# Patient Record
Sex: MALE | Race: WHITE
[De-identification: names, ages, dates, MRNs, and addresses within clinical notes are randomized per-mention and may not be internally consistent; named-entity substitution may affect disease eponyms.]

---

## 2020-01-16 ENCOUNTER — HOSPITAL ENCOUNTER (EMERGENCY)
Dept: HOSPITAL 25 - UCCORT | Age: 25
Discharge: HOME | End: 2020-01-16
Payer: COMMERCIAL

## 2020-01-16 VITALS — DIASTOLIC BLOOD PRESSURE: 76 MMHG | SYSTOLIC BLOOD PRESSURE: 136 MMHG

## 2020-01-16 DIAGNOSIS — F17.210: ICD-10-CM

## 2020-01-16 DIAGNOSIS — B02.9: Primary | ICD-10-CM

## 2020-01-16 PROCEDURE — G0463 HOSPITAL OUTPT CLINIC VISIT: HCPCS

## 2020-01-16 PROCEDURE — 99202 OFFICE O/P NEW SF 15 MIN: CPT

## 2020-01-16 NOTE — UC
General HPI





- HPI Summary


HPI Summary: 





States he noticed left armpit pain about a week ago.  A few days later he 

noticed a rash on the back of his arm near his armpit.  States the rash is 

itchy and he has been putting hydrocortisone on it.  No fevers.  No weight loss 

or night sweats.  Mild chronic cough, smokes 1/2 ppd.  Denies any trauma. 

States he has been under stress lately with moving into a new house and getting 

laid off from his job.  Meds: reviewed 





- History of Current Complaint


Chief Complaint: UCGeneralIllness


Stated Complaint: LT ARMPIT PAIN


Time Seen by Provider: 01/16/20 07:53


Pain Intensity: 5





- Allergy/Home Medications


Allergies/Adverse Reactions: 


 Allergies











Allergy/AdvReac Type Severity Reaction Status Date / Time


 


No Known Allergies Allergy   Verified 01/16/20 07:32














PMH/Surg Hx/FS Hx/Imm Hx


Previously Healthy: Yes





- Surgical History


Surgical History: Yes


Surgery Procedure, Year, and Place: neck mole removed as child.  dental 

extractions, 1/3/20





- Family History


Known Family History: Positive: None


Family History: no cardio-vascular issues in patients family





- Social History


Alcohol Use: Occasionally


Substance Use Type: None


Smoking Status (MU): Heavy Every Day Tobacco Smoker


Type: Cigarettes


Amount Used/How Often: 8-10 CIGS DAILY


Length of Time of Smoking/Using Tobacco: 3yrs


Have You Smoked in the Last Year: Yes


Household Exposure Type: Cigarettes





Review of Systems


All Other Systems Reviewed And Are Negative: Yes


Constitutional: Positive: Negative





Physical Exam


Triage Information Reviewed: Yes


Appearance: Well-Appearing


Vital Signs: 


 Initial Vital Signs











Temp  98.5 F   01/16/20 07:33


 


Pulse  76   01/16/20 07:33


 


Resp  16   01/16/20 07:33


 


BP  136/76   01/16/20 07:33


 


Pulse Ox  100   01/16/20 07:33











Vital Signs Reviewed: Yes


Eyes: Positive: Conjunctiva Clear


ENT: Positive: Normal ENT inspection


Neck: Positive: Supple, Nontender


Skin: Positive: Other - vesicular rash with erythema along dermatomal pattern 

on upper left torso.  No drainage.  No masses or lesions in armpit region.  

Could not apprecaite significant adenopathy





Course/Dx





- Course


Course Of Treatment: 





Rash with armpit pain 





Rash consistent with shingles 








Plan 


Discussed discontinuing the hydrocortisone 





Start Valtrex as prescribed 


Would start with Ibuprofen as needed for pain 


Keep area covered until area scabs over 





If symptoms persist or worsen, recommend follow up with PCP or return to urgent 

care





- Diagnoses


Provider Diagnosis: 


 Shingles








Discharge ED





- Sign-Out/Discharge


Documenting (check all that apply): Patient Departure


All imaging exams completed and their final reports reviewed: No Studies





- Discharge Plan


Condition: Good


Disposition: HOME


Prescriptions: 


ValACYclovir (*) [Valtrex 1 GM(*)] 1 gm PO Q8HR #21 tab


Patient Education Materials:  Shingles (ED)


Referrals: 


No Primary Care Phys,NOPCP [Primary Care Provider] - 


Additional Instructions: 


Start Valtrex as prescribed 


Would start with Ibuprofen as needed for pain 


Keep area covered until area scabs over 





If symptoms persist or worsen, recommend follow up with PCP or return to urgent 

care 





- Billing Disposition and Condition


Condition: GOOD


Disposition: Home